# Patient Record
Sex: FEMALE | Race: WHITE
[De-identification: names, ages, dates, MRNs, and addresses within clinical notes are randomized per-mention and may not be internally consistent; named-entity substitution may affect disease eponyms.]

---

## 2018-06-04 ENCOUNTER — HOSPITAL ENCOUNTER (EMERGENCY)
Dept: HOSPITAL 17 - PHEFT | Age: 73
Discharge: HOME | End: 2018-06-04
Payer: MEDICARE

## 2018-06-04 VITALS
TEMPERATURE: 98.9 F | HEART RATE: 74 BPM | OXYGEN SATURATION: 95 % | DIASTOLIC BLOOD PRESSURE: 73 MMHG | RESPIRATION RATE: 16 BRPM | SYSTOLIC BLOOD PRESSURE: 169 MMHG

## 2018-06-04 VITALS — WEIGHT: 136.69 LBS | HEIGHT: 66 IN | BODY MASS INDEX: 21.97 KG/M2

## 2018-06-04 DIAGNOSIS — I10: ICD-10-CM

## 2018-06-04 DIAGNOSIS — K21.9: ICD-10-CM

## 2018-06-04 DIAGNOSIS — F32.9: ICD-10-CM

## 2018-06-04 DIAGNOSIS — S81.812A: Primary | ICD-10-CM

## 2018-06-04 DIAGNOSIS — S09.90XA: ICD-10-CM

## 2018-06-04 DIAGNOSIS — E78.00: ICD-10-CM

## 2018-06-04 DIAGNOSIS — F41.9: ICD-10-CM
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
## 2018-06-04 DIAGNOSIS — G35: ICD-10-CM

## 2018-06-04 PROCEDURE — 70450 CT HEAD/BRAIN W/O DYE: CPT

## 2018-06-04 PROCEDURE — 12004 RPR S/N/AX/GEN/TRK7.6-12.5CM: CPT

## 2018-06-04 NOTE — PD
HPI


Chief Complaint:  Laceration/Skin Injury


Time Seen by Provider:  12:38


Travel History


International Travel<30 days:  No


Contact w/Intl Traveler<30days:  No


Traveled to known affect area:  No





History of Present Illness


HPI


73-year-old female here for evaluation of head injury and left lower extremity 

injury.  She reports she attempted to sit on the edge of the bed falling to the 

left side injuring her head and left leg on a nearby piece of furniture.  There 

was no loss of consciousness.  She is not anticoagulated.  She has a left-sided 

generalized headache.  No visual changes.  No neck pain.  No chest pain or 

shortness of breath.  No abdominal pain.  No altered sensation or weakness of 

the lower extremity.  She constant aching pain at the site of a laceration to 

the left lower extremity.  Tetanus immunization is up-to-date.





PFSH


Past Medical History


Hx Anticoagulant Therapy:  No


Autoimmune Disease:  Yes (MULTIPLE SCLEROSIS)


Anxiety:  Yes


Depression:  Yes


Cardiovascular Problems:  Yes (HTN, CHOL)


High Cholesterol:  Yes


Congestive Heart Failure:  Yes ( PT STATES CHF RESOLVED )


Diabetes:  No


Diminished Hearing:  No


Endocrine:  Yes (PARATHYROID)


GERD:  Yes


Hypertension:  Yes


Immunizations Current:  Yes


Thyroid Disease:  Yes (PARATHYROID)


Tetanus Vaccination:  < 5 Years


Influenza Vaccination:  Yes


Pregnant?:  Not Pregnant


Ovarian Cysts:  Yes





Past Surgical History


Appendectomy:  Yes


 Section:  Yes (X2)


Cholecystectomy:  Yes


Endocrine Surgery:  Yes (PARATHYROID-THYROIDECTOMY)


Eye Surgery:  Yes (CATARACTS)


Genitourinary Surgery:  Yes (RT NEPHRECTOMY)


Hysterectomy:  Yes


Mastectomy:  Yes (BILATERAL - CYSTS)


Thoracic Surgery:  Yes (R LUNG TUMOR REMOVED)


Other Surgery:  Yes (BILATERAL MASTECTOMY)





Social History


Alcohol Use:  Yes (RARE)


Tobacco Use:  No


Substance Use:  No





Allergies-Medications


(Allergen,Severity, Reaction):  


Coded Allergies:  


     ciprofloxacin (Unverified  Allergy, Severe, 18)


     venlafaxine (Unverified  Adverse Reaction, Intermediate, VERY HYPER, 18

)


Reported Meds & Prescriptions





Reported Meds & Active Scripts


Active


Reported


Mirtazapine 45 Mg Tab 45 Mg PO HS


Myrbetriq (Mirabegron) 50 Mg Tab 50 Mg PO DAILY


Estradiol 0.5 Mg Tab 0.5 Mg PO DAILY


Vitamin B-12 (Cyanocobalamin) 1,000 Mcg Tab 1,000 Mcg PO DAILY


Omeprazole 20 Mg Tab 20 Mg PO DAILY


Flexeril (Cyclobenzaprine HCl) 10 Mg Tab 10 Mg PO TID


Wellbutrin Xl 24 HR (Bupropion HCl) 300 Mg Tab 300 Mg PO DAILY


Clonazepam 0.5 Mg Tab 0.5 Mg PO TID


Baclofen 20 Mg Tab 20 Mg PO TID


Losartan (Losartan Potassium) 100 Mg Tab 100 Mg PO DAILY


Bumetanide 0.5 Mg Tab 0.5 Mg PO DAILY


Metoprolol Tartrate 50 Mg Tab 50 Mg PO DAILY


Amlodipine (Amlodipine Besylate) 2.5 Mg Tab 2.5 Mg PO DAILY


Levothyroxine (Levothyroxine Sodium) 100 Mcg Tab 100 Mcg PO DAILY








Review of Systems


Except as stated in HPI:  all other systems reviewed are Neg


General / Constitutional:  No: Fever


Eyes:  No: Visual changes


HENT:  No: Headaches


Cardiovascular:  No: Chest Pain or Discomfort


Respiratory:  No: Shortness of Breath


Gastrointestinal:  No: Abdominal Pain


Genitourinary:  No: Dysuria





Physical Exam


Narrative


GENERAL: Alert and well-appearing 73-year-old female


SKIN: Laceration to the left lower extremity


HEAD: Normocephalic.  Small hematoma to the left forehead with 0.5 cm avulsion 

skin injury to the left eyebrow.


EYES: Pupils equal, round, reactive to light.  EOMs intact.. No injection or 

drainage. 


NECK: Supple, trachea midline. No cervical midline tenderness.  Can freely move 

the neck.


CARDIOVASCULAR: Regular rate and rhythm without murmurs, gallops, or rubs.  No 

chest wall tenderness.


RESPIRATORY: Breath sounds equal bilaterally. No accessory muscle use.


GASTROINTESTINAL: Abdomen soft, non-tender, nondistended. 


MUSCULOSKELETAL: No cyanosis, or edema. LLE: 9CM flap laceration/skin tear to 

the lateral lower extremity.  Vascular tendon injury.  Full range of motion of 

the ankle and all toes.  Normal sensation.  Palpable DP pulse.  Brisk cap 

refill.


BACK: Nontender without obvious deformity. No CVA tenderness.








Data


Data


Last Documented VS





Vital Signs








  Date Time  Temp Pulse Resp B/P (MAP) Pulse Ox O2 Delivery O2 Flow Rate FiO2


 


18 11:55 98.9 74 16 169/73 (105) 95   








Orders





 Orders


Ct Brain W/O Iv Contrast(Rout) (18 )


Acetaminophen (Tylenol) (18 13:45)








MDM


Medical Decision Making


Medical Screen Exam Complete:  Yes


Emergency Medical Condition:  Yes


Differential Diagnosis


Left lower extremity laceration, tendon injury, facial laceration, ICH, scalp 

contusion


Narrative Course


73-year-old female with facial contusion and laceration to the left lower 

extremity.  She has a normal neurologic exam.  The left lower extremity is 

neurovascularly intact.  Laceration repair performed.  CT the brain is negative 

for hemorrhage.  She is stable and ready for discharge





Procedures


**Procedure Narrative**


LACERATION


LOCATION: Left lower extremity


LENGTH: 9 cm


NUMBER OF STITCHES/STAPLES: 15





REPAIR: The area of the laceration was prepped with Betadine and sterilely 

draped.  The laceration was infiltrated with  


1% lidocaine.  The wound was copiously irrigated and explored without evidence 

of foreign body, tendon injury or neurovascular  


injury.  The wound was closed using 3-0 Ethilon. This was a single layer 

repair. A sterile dressing was applied. The patient was  


advised to keep the dressing clean and dry. Patient tolerated the procedure 

well.





Diagnosis





 Primary Impression:  


 Laceration of lower extremity


 Qualified Codes:  S81.812A - Laceration without foreign body, left lower leg, 

initial encounter


 Additional Impression:  


 Head injury


 Qualified Codes:  S09.90XA - Unspecified injury of head, initial encounter


Referrals:  


Primary Care Physician





***Additional Instructions:  


Sutures need to be removed in 10 days.


Do not submerge the wound in water.


Follow-up with your primary doctor for recheck.


Return to the ER if he develop new or worsening symptoms as discussed


Disposition:  01 DISCHARGE HOME


Condition:  Stable











Xiomara Nolan 2018 13:04

## 2018-06-04 NOTE — RADRPT
EXAM DATE:  6/4/2018 1:54 PM EDT

AGE/SEX:        73 years / Female



INDICATIONS:  Fell and hit left side of head.



CLINICAL DATA:  This is the patient's initial encounter. Patient reports that signs and symptoms have
 been present for 1 day and indicates a pain score of 4/10. 

                                                                          

MEDICAL/SURGICAL HISTORY:   Hypertension.  Hypercholesterolemia. Nephrectomy, right.  Thyroidectomy.



RADIATION DOSE:  50.78 CTDI (mGy)







COMPARISON:      No prior [facility] exams available for comparison.





TECHNIQUE:  CT of the head without contrast.  Using automated exposure control and adjustment of the 
mA and/or kV according to patient size, radiation dose was kept as low as reasonably achievable to ob
tain optimal diagnostic quality images.



FINDINGS: 

Cerebrum:  The ventricles are normal for age.  No evidence of midline shift, mass lesion, hemorrhage 
or acute infarction.  No extraaxial fluid collections are seen.

Posterior Fossa:  The cerebellum and brainstem are intact.  The 4th ventricle is midline.  The cerebe
llopontine angle is unremarkable.

Extracranial:  The visualized portion of the orbits is intact. Small area of left frontal soft tissue
 swelling.

Skull:  The calvaria is intact.  No evidence of skull fracture.



CONCLUSION:

1.     Small area of soft tissue swelling involving the left frontal region. Otherwise, no acute intr
acranial abnormality.



Electronically signed by: Latrell Deutsch MD  6/4/2018 2:01 PM EDT